# Patient Record
Sex: FEMALE | Race: WHITE
[De-identification: names, ages, dates, MRNs, and addresses within clinical notes are randomized per-mention and may not be internally consistent; named-entity substitution may affect disease eponyms.]

---

## 2019-06-24 ENCOUNTER — HOSPITAL ENCOUNTER (EMERGENCY)
Dept: HOSPITAL 18 - NAV ERS | Age: 19
Discharge: HOME | End: 2019-06-24
Payer: MEDICARE
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
## 2019-06-24 DIAGNOSIS — F31.9: ICD-10-CM

## 2019-06-24 DIAGNOSIS — S93.402A: Primary | ICD-10-CM

## 2019-06-24 DIAGNOSIS — F41.9: ICD-10-CM

## 2019-06-24 DIAGNOSIS — M25.562: ICD-10-CM

## 2019-06-24 DIAGNOSIS — F32.9: ICD-10-CM

## 2019-06-24 DIAGNOSIS — F43.10: ICD-10-CM

## 2019-06-24 DIAGNOSIS — Z79.899: ICD-10-CM

## 2019-06-24 NOTE — RAD
Exam:

XR Knee Lt 4 View STANDARD



HISTORY:

Left knee locking when standing up. Pain left anterior knee.



COMPARISON:

None



FINDINGS:



No acute fracture, dislocation, or other acute osseous abnormality is identified.



IMPRESSION:

No acute osseous abnormality is identified.



Reported By: Elpidio Amaya 

Electronically Signed:  6/24/2019 11:34 AM

## 2019-06-24 NOTE — RAD
LEFT ANKLE 3 VIEWS:

 

HISTORY: 

Left ankle pain.

 

FINDINGS/IMPRESSION: 

The ankle mortise is maintained.  No fracture, dislocation, or bony destruction is seen.  

 

POS: OFF